# Patient Record
Sex: FEMALE | Race: OTHER | Employment: OTHER | URBAN - METROPOLITAN AREA
[De-identification: names, ages, dates, MRNs, and addresses within clinical notes are randomized per-mention and may not be internally consistent; named-entity substitution may affect disease eponyms.]

---

## 2019-03-20 NOTE — PATIENT DISCUSSION
The patient was informed that with [facility] for distance, they will need glasses for all near and intermediate activities after surgery. The patient understands there is a possibility they may need an enhancement after surgery. The patient elects [employer] IOL OD, goal of emmetropia.

## 2019-04-08 NOTE — PATIENT DISCUSSION
The patient was informed that with [address] [facility] for distance, they will need glasses for all near and intermediate activities after surgery. The patient understands there is a possibility they may need an enhancement after surgery. The patient elects [employer] IOL OD, goal of emmetropia.

## 2019-04-08 NOTE — PATIENT DISCUSSION
The patient was informed that with [address] [facility] for distance, they will need glasses for all near and intermediate activities after surgery. The patient understands there is a possibility they may need an enhancement after surgery. The patient elects [employer] IOL OD, goal of emmetropia.

## 2019-04-09 NOTE — PATIENT DISCUSSION
Cataract surgery has been performed in the first eye and activities of daily living are still impaired. The patient would like to proceed with cataract surgery in the second eye as scheduled. The patient elects CV IOL OS, goal of emmetropia.

## 2019-05-10 NOTE — PATIENT DISCUSSION
This visual field clearly demonstrated a minimum of 52% loss of upper field of vision OU, with upper lid skin in repose and elevated by taping of the lid to demonstrate potential correction. This field shows that taping the lids significantly improved this patient's superior field of vision by approximately 50%, OU.

## 2019-05-10 NOTE — PATIENT DISCUSSION
Assessment/Plan:         Diagnoses and all orders for this visit:    Immunization due  -     PNEUMOCOCCAL CONJUGATE VACCINE 13-VALENT GREATER THAN 6 MONTHS    Type 2 diabetes mellitus without complication, without long-term current use of insulin (HCC)  -     Microalbumin / creatinine urine ratio    Colon cancer screening  -     Occult Blood, Fecal Immunochemical; Future    Encounter for screening mammogram for malignant neoplasm of breast  -     Mammo screening bilateral w 3d & cad; Future    Need for influenza vaccination  -     influenza vaccine, [id number], quadrivalent, recombinant, PF, 0 5 mL, for patients 18 yr+ (FLUBLOK)  Flu shot     Benign essential hypertension  Exercise, continue present rx No added salt    Bipolar disorder, in full remission, most recent episode depressed ([facility] [address] )  Seeing Dr Tae Weinberg every three months and has been stable other than sleep issues which she will discuss with them    Other orders  -     glimepiride (AMARYL) 1 mg tablet; Discussed shingrix       Patient ID: Sahara Woodard is a 60 y o  female  HPI   Pt doing well overall  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  Her sugar trend has increased slightly  No sob or chest pain  Seeing Dr Tae Weinberg practice every three months and overall stable  Restarted exercise program  Asking for injectable for Blood suagrs that she can get thru the company [employer]          Review of Systems   Constitutional: Negative  HENT: Negative  Eyes: Negative  Respiratory: Negative  Cardiovascular: Negative  Gastrointestinal: Negative  Endocrine: Negative  Genitourinary: Negative  Musculoskeletal: Positive for back pain  Skin: Negative  Allergic/Immunologic: Negative  Neurological: Negative  Hematological: Negative  Psychiatric/Behavioral: Negative        Past Medical History:   Diagnosis Date    Alcohol abuse, uncomplicated     Anemia     last assessed 4/25/16    [facility]     Diabetes Refer for Blepharoplasty Evaluation. mellitus ([address] )     GERD (gastroesophageal reflux disease)     Hypertension     Hypothyroidism     last assessed 7/16/15    Opioid abuse [facility])      Past Surgical History:   Procedure Laterality Date    ANKLE SURGERY      last assessed 11/16/16    CERVICAL FUSION  03/2007    GASTRIC BYPASS  2000     Social History     Social History    Marital status: /Civil Union     Spouse name: N/A    Number of children: N/A    Years of education: N/A     Occupational History    Not on file  Social History Main Topics    Smoking status: Never Smoker    Smokeless tobacco: Never Used      Comment: former per [employer]    Alcohol use No      Comment: Stopped drinking alcohol    Drug use: No    Sexual activity: Not on file     Other Topics Concern    Not on file     Social History Narrative    Caffeine use    Regular dental care    Feels safe at home    Sunscreen use     Uses seatbelts     No Known Allergies            /70   Pulse 78   Temp (!) 97 3 °F (36 3 °C) (Tympanic)   SpO2 99%          Physical Exam   Constitutional: She is oriented to person, place, and time  She appears well-developed and well-nourished  No distress  HENT:   Head: Normocephalic and atraumatic  Right Ear: External ear normal    Left Ear: External ear normal    Nose: Nose normal    Mouth/Throat: Oropharynx is clear and moist  No oropharyngeal exudate  Eyes: Pupils are equal, round, and reactive to light  Conjunctivae and EOM are normal  No scleral icterus  Neck: Normal range of motion  Neck supple  No JVD present  Cardiovascular: Normal rate and regular rhythm  Pulmonary/Chest: Effort normal and breath sounds normal    Abdominal: Soft  Bowel sounds are normal    Musculoskeletal: Normal range of motion  Lymphadenopathy:     She has no cervical adenopathy  Neurological: She is alert and oriented to person, place, and time  She has normal reflexes  No cranial nerve deficit  Skin: Skin is warm and dry   She is not diaphoretic  Psychiatric: She has a normal mood and affect  Her behavior is normal  Judgment and thought content normal    Nursing note and vitals reviewed

## 2019-06-09 NOTE — PATIENT DISCUSSION
1 day PO: Patient is doing well post-operatively. The importance of post-op drop compliance was emphasized. Drop schedule reviewed with patient. Patient to call if any visual changes or concerns. 91

## 2019-06-25 NOTE — PROCEDURE NOTE: SURGICAL
<p><strong>MR #:</strong>&nbsp; [id number]<MC /><br /><strong>PREOPERATIVE DIAGNOSIS: &nbsp; &nbsp; </strong>1. Dermatochalasis upper lids; 2. Lower lid fat herniation and excess skin 3. Both lower lids Seborrheic Keratosis. <br /><br /><strong>POSTOPERATIVE DIAGNOSIS: &nbsp; </strong>Same <br /><br /><strong>PROCEDURE: &nbsp; </strong>1. Upper lid blepharoplasty both eyes; 2. C02 Laser transconjunctival blepharoplasty lower lids; 3. Skin resurfacing of lower lids 4. Cauterized both lower lids Seborrheic Keratosis. <br /><br /><strong>ANESTHESIA: &nbsp; &nbsp; </strong>Local MAC<br /><br /><strong>ESTIMATED BLOOD LOSS: &nbsp; </strong>&nbsp;Minimal, &lt;5 cc <br /><br /><strong>COMPLICATIONS: &nbsp; </strong>&nbsp;None<br /><br /><strong>INDICATION: &nbsp;</strong>This patient had complaints of heavy skin and muscle of the upper eyelids that comes down over the eyelids and affects vision. &nbsp; Examination complete with a photograph of the patient confirmed extra upper lid skin that hangs over the eye and blocks vision. Superior visual field defects were also documented on Poe visual field with marked improvement in the superior scotomas after taping the lids up. We have discussed that a standard blepharoplasty operation would remove this extra tissue from the upper lids and allow an improvement in their abnormal visual field. Patient understands the risks and benefits, including the risk of ectropion and scleral show and wishes to proceed. This patient also has large herniated fat on the lower lids and redundant skin of the lower lids. We decided to perform a lower blepharoplasty operation to remove lower lid fat bags and to lightly resurface the skin to tighten them up. Patient understands the risks and benefits of the surgery and wishes to proceed. <br /><br /><strong>PROCEDURE: &nbsp;</strong>The patient was brought to the operating room and laid in the supine position. Prior to surgery, a time-out was performed in the operating room, and the nurse confirmed the patient&rsquo;s identity and name, the side and site of the surgery and the type of surgery and procedure to be performed. &nbsp; I proceeded with the marking of the patient with a marking pen, in the areas of surgery to be performed. The patient had upper lid crease markings drawn out with a marking pen. The skin of the upper lids was then tentatively bunched with two forceps to determine the amount of skin to be removed so the patient could still close their eye and not have lagophthalmos. The superior portion of the blepharoplasty incision line was then marked in an eclipse with a marking pen. The patient was then sedated and injected with Xylocaine 2% with epinephrine. &nbsp; Approximately 6cc&rsquo;s were used for both lids. &nbsp; A drop of Alcaine was placed over both eyes. The patient was then prepped and draped in typical fashion for facial plastic surgery. Laser safe protective corneal lenses were then placed over both eyes. Two forceps were then used to recheck the upper lid measurements and the patient was remarked with a blue marking pen in a spindle shape fashion on each upper lid. A 15 blade was first used to make an incision through each pre-marked area on each upper lid. &nbsp; A Bovie cautery on cutting mode with a [place] tip was then used to remove a skin muscle flap on each upper lid. &nbsp; The orbital septum was opened and the orbital fat selectively removed with the Bovie Cautery from each upper lid. Meticulous hemostasis was achieved. After cauterizing all bleeders, each wound was then closed with running 6-0 plain sutures. &nbsp; The patient was inspected for contour and shape. An excellent appearance was noted. <br /><br />Attention was turned to the lower blepharoplasty operation where a rake was then inserted into the right lower lid, retracting it downward. A transconjunctival incision was made 2mm below the tarsus with the Bovie cautery. This incision was carried across the extent of the inner lid. Dissection was carried down with the Bovie cautery while pulling upward on the conjunctiva and retractor layer with a forceps. The fat was released from the septum and allowed to herniate forward exposing the nasal, central and temporal fat pads. Care was taken to avoid the inferior oblique muscle. These pads were teased forward and isolated. The fat was removed selectively with a Bovie cautery with excellent hemostasis. &nbsp; After judging adequate fat removal, the left lower lid had similar procedure. The skin was then resurfaced using the resurfacing CO2 laser on a low setting of 16 bella scan mode for an initial pass on both lower lids. This was done to tighten up the skin to remove wrinkles. This patient did have lid laxity so the laser was not aggressively performed on this patient in order to avoid ectropion. A second pass on the malar area was performed with the C02 laser set again at 16 bella. This was done to tighten up the malar area so fluid bags would not collect on the cheeks of this patient. </p><p>At the conclusion of the case several Seborrheic Keratosis were removed with Bovie cautery as cosmetic procedure @ N/C.</p><p>At the end of this procedure the laser lens were removed. The patient was cleaned; antibiotic salve was placed on the wounds. The patient was sent to recovery room in stable condition. <br /></p>

## 2021-05-28 ENCOUNTER — NEW PATIENT COMPREHENSIVE (OUTPATIENT)
Dept: URBAN - METROPOLITAN AREA CLINIC 36 | Facility: CLINIC | Age: 66
End: 2021-05-28

## 2021-05-28 DIAGNOSIS — H52.7: ICD-10-CM

## 2021-05-28 DIAGNOSIS — H25.813: ICD-10-CM

## 2021-05-28 PROCEDURE — 92004 COMPRE OPH EXAM NEW PT 1/>: CPT

## 2021-05-28 PROCEDURE — 92015 DETERMINE REFRACTIVE STATE: CPT

## 2021-05-28 ASSESSMENT — VISUAL ACUITY
OD_CC: 20/25-1
OD_SC: 20/200
OS_BAT: 20/70
OS_CC: 20/25-2
OD_CC: J12
OS_SC: 20/80-1
OD_BAT: 20/70
OS_CC: J10

## 2021-05-28 ASSESSMENT — TONOMETRY
OS_IOP_MMHG: 19
OD_IOP_MMHG: 19

## 2021-07-09 ENCOUNTER — GLASSES RECHECK (OUTPATIENT)
Dept: URBAN - METROPOLITAN AREA CLINIC 46 | Facility: CLINIC | Age: 66
End: 2021-07-09

## 2021-07-09 DIAGNOSIS — H52.7: ICD-10-CM

## 2021-07-09 PROCEDURE — 92015GRNC REFRACTION GLASSES RECHECK - NO CHARGE

## 2021-07-09 ASSESSMENT — VISUAL ACUITY
OD_CC: J1
OD_CC: 20/25
OS_CC: J1
OS_CC: 20/25

## 2021-11-18 ENCOUNTER — APPOINTMENT (RX ONLY)
Dept: URBAN - METROPOLITAN AREA CLINIC 132 | Facility: CLINIC | Age: 66
Setting detail: DERMATOLOGY
End: 2021-11-18

## 2021-11-18 DIAGNOSIS — D18.0 HEMANGIOMA: ICD-10-CM | Status: STABLE

## 2021-11-18 DIAGNOSIS — L82.1 OTHER SEBORRHEIC KERATOSIS: ICD-10-CM | Status: STABLE

## 2021-11-18 DIAGNOSIS — D22 MELANOCYTIC NEVI: ICD-10-CM | Status: STABLE

## 2021-11-18 DIAGNOSIS — L57.8 OTHER SKIN CHANGES DUE TO CHRONIC EXPOSURE TO NONIONIZING RADIATION: ICD-10-CM

## 2021-11-18 DIAGNOSIS — L81.4 OTHER MELANIN HYPERPIGMENTATION: ICD-10-CM | Status: STABLE

## 2021-11-18 PROBLEM — D18.01 HEMANGIOMA OF SKIN AND SUBCUTANEOUS TISSUE: Status: ACTIVE | Noted: 2021-11-18

## 2021-11-18 PROBLEM — D22.9 MELANOCYTIC NEVI, UNSPECIFIED: Status: ACTIVE | Noted: 2021-11-18

## 2021-11-18 PROCEDURE — ? SUNSCREEN RECOMMENDATIONS

## 2021-11-18 PROCEDURE — ? PRESCRIPTION MEDICATION MANAGEMENT

## 2021-11-18 PROCEDURE — ? COUNSELING

## 2021-11-18 PROCEDURE — 99203 OFFICE O/P NEW LOW 30 MIN: CPT

## 2021-11-18 NOTE — PROCEDURE: PRESCRIPTION MEDICATION MANAGEMENT
Detail Level: Zone
Render In Strict Bullet Format?: No
Plan: Patient declined prescription for rebrightalyze today.

## 2024-07-18 ENCOUNTER — HOSPITAL ENCOUNTER (EMERGENCY)
Facility: HOSPITAL | Age: 69
Discharge: HOME | End: 2024-07-18
Attending: EMERGENCY MEDICINE
Payer: COMMERCIAL

## 2024-07-18 ENCOUNTER — APPOINTMENT (EMERGENCY)
Dept: CT IMAGING | Facility: HOSPITAL | Age: 69
End: 2024-07-18
Payer: COMMERCIAL

## 2024-07-18 VITALS
BODY MASS INDEX: 26.54 KG/M2 | WEIGHT: 165.12 LBS | RESPIRATION RATE: 18 BRPM | DIASTOLIC BLOOD PRESSURE: 79 MMHG | HEIGHT: 66 IN | TEMPERATURE: 97.7 F | SYSTOLIC BLOOD PRESSURE: 177 MMHG | OXYGEN SATURATION: 99 % | HEART RATE: 84 BPM

## 2024-07-18 DIAGNOSIS — R51.9 HEADACHE: Primary | ICD-10-CM

## 2024-07-18 LAB
ANION GAP SERPL CALCULATED.3IONS-SCNC: 7 MMOL/L (ref 4–13)
BASOPHILS # BLD AUTO: 0.07 THOUSANDS/ÂΜL (ref 0–0.1)
BASOPHILS NFR BLD AUTO: 1 % (ref 0–1)
BUN SERPL-MCNC: 18 MG/DL (ref 5–25)
CALCIUM SERPL-MCNC: 9.1 MG/DL (ref 8.4–10.2)
CHLORIDE SERPL-SCNC: 110 MMOL/L (ref 96–108)
CO2 SERPL-SCNC: 23 MMOL/L (ref 21–32)
CREAT SERPL-MCNC: 0.81 MG/DL (ref 0.6–1.3)
EOSINOPHIL # BLD AUTO: 0.19 THOUSAND/ÂΜL (ref 0–0.61)
EOSINOPHIL NFR BLD AUTO: 2 % (ref 0–6)
ERYTHROCYTE [DISTWIDTH] IN BLOOD BY AUTOMATED COUNT: 13.2 % (ref 11.6–15.1)
ERYTHROCYTE [SEDIMENTATION RATE] IN BLOOD: 6 MM/HOUR (ref 0–29)
GFR SERPL CREATININE-BSD FRML MDRD: 73 ML/MIN/1.73SQ M
GLUCOSE SERPL-MCNC: 106 MG/DL (ref 65–140)
HCT VFR BLD AUTO: 38.3 % (ref 34.8–46.1)
HGB BLD-MCNC: 13 G/DL (ref 11.5–15.4)
IMM GRANULOCYTES # BLD AUTO: 0.05 THOUSAND/UL (ref 0–0.2)
IMM GRANULOCYTES NFR BLD AUTO: 1 % (ref 0–2)
LYMPHOCYTES # BLD AUTO: 2.83 THOUSANDS/ÂΜL (ref 0.6–4.47)
LYMPHOCYTES NFR BLD AUTO: 32 % (ref 14–44)
MCH RBC QN AUTO: 31 PG (ref 26.8–34.3)
MCHC RBC AUTO-ENTMCNC: 33.9 G/DL (ref 31.4–37.4)
MCV RBC AUTO: 91 FL (ref 82–98)
MONOCYTES # BLD AUTO: 0.54 THOUSAND/ÂΜL (ref 0.17–1.22)
MONOCYTES NFR BLD AUTO: 6 % (ref 4–12)
NEUTROPHILS # BLD AUTO: 5.21 THOUSANDS/ÂΜL (ref 1.85–7.62)
NEUTS SEG NFR BLD AUTO: 58 % (ref 43–75)
NRBC BLD AUTO-RTO: 0 /100 WBCS
PLATELET # BLD AUTO: 249 THOUSANDS/UL (ref 149–390)
PMV BLD AUTO: 9.4 FL (ref 8.9–12.7)
POTASSIUM SERPL-SCNC: 3.9 MMOL/L (ref 3.5–5.3)
RBC # BLD AUTO: 4.19 MILLION/UL (ref 3.81–5.12)
SODIUM SERPL-SCNC: 140 MMOL/L (ref 135–147)
WBC # BLD AUTO: 8.89 THOUSAND/UL (ref 4.31–10.16)

## 2024-07-18 PROCEDURE — 99285 EMERGENCY DEPT VISIT HI MDM: CPT | Performed by: EMERGENCY MEDICINE

## 2024-07-18 PROCEDURE — 36415 COLL VENOUS BLD VENIPUNCTURE: CPT | Performed by: EMERGENCY MEDICINE

## 2024-07-18 PROCEDURE — 99283 EMERGENCY DEPT VISIT LOW MDM: CPT

## 2024-07-18 PROCEDURE — 85025 COMPLETE CBC W/AUTO DIFF WBC: CPT | Performed by: EMERGENCY MEDICINE

## 2024-07-18 PROCEDURE — 70498 CT ANGIOGRAPHY NECK: CPT

## 2024-07-18 PROCEDURE — 85652 RBC SED RATE AUTOMATED: CPT | Performed by: EMERGENCY MEDICINE

## 2024-07-18 PROCEDURE — 80048 BASIC METABOLIC PNL TOTAL CA: CPT | Performed by: EMERGENCY MEDICINE

## 2024-07-18 PROCEDURE — 70496 CT ANGIOGRAPHY HEAD: CPT

## 2024-07-18 PROCEDURE — 96360 HYDRATION IV INFUSION INIT: CPT

## 2024-07-18 RX ORDER — AMLODIPINE BESYLATE 5 MG/1
5 TABLET ORAL DAILY
COMMUNITY
Start: 2024-07-09 | End: 2025-07-09

## 2024-07-18 RX ORDER — ALENDRONATE SODIUM 70 MG/1
70 TABLET ORAL WEEKLY
COMMUNITY

## 2024-07-18 RX ORDER — PAROXETINE HYDROCHLORIDE 20 MG/1
20 TABLET, FILM COATED ORAL DAILY
COMMUNITY
Start: 2024-03-11 | End: 2025-03-11

## 2024-07-18 RX ORDER — OXYBUTYNIN CHLORIDE 5 MG/1
5 TABLET ORAL DAILY
COMMUNITY
Start: 2024-07-03

## 2024-07-18 RX ORDER — CALCIUM/MAG/D3/B12/FA/B6/BORON 500-1.1MG
TABLET ORAL DAILY
COMMUNITY

## 2024-07-18 RX ADMIN — SODIUM CHLORIDE 500 ML: 0.9 INJECTION, SOLUTION INTRAVENOUS at 15:46

## 2024-07-18 RX ADMIN — IOHEXOL 85 ML: 350 INJECTION, SOLUTION INTRAVENOUS at 15:59

## 2024-07-18 NOTE — ED PROVIDER NOTES
History  Chief Complaint   Patient presents with    Headache     Pt presented to this ED from home c/o continuous headache radiating behind left eye and to left side neck w/intermittent dizziness over past 2 wks. Pt seen pcp last week, ordered scan but was cancelled 2 days ago by facility due to availability. Pt taking tylenol w/o relief. Denies travel/sob/fevers/cough/n/v/d     69-year-old female describes subacute onset shocky left temporal pain shooting into face intermittently with associated left neck pain, pressure.  No numbness or weakness.  No vision changes.  No injury.  Not worse with movement.  States started 2 weeks ago slowly and has progressively worsened.  Notes she has seen her primary care clinician and scheduled for CT evaluation but was canceled and still waiting to reschedule.  Notes history of migraine.  No symptoms not amenable to acetaminophen.  No pain currently      History provided by:  Patient  Headache  Pain location:  L temporal  Quality:  Stabbing  Radiates to:  L neck  Onset quality:  Gradual  Duration:  2 weeks  Timing:  Constant  Progression:  Worsening  Chronicity:  New  Similar to prior headaches: no    Relieved by:  Nothing  Worsened by:  Nothing  Ineffective treatments:  None tried  Associated symptoms: facial pain and neck pain    Associated symptoms: no abdominal pain, no blurred vision, no dizziness, no eye pain, no fever, no focal weakness, no loss of balance, no nausea, no neck stiffness, no numbness, no paresthesias, no vomiting and no weakness        Prior to Admission Medications   Prescriptions Last Dose Informant Patient Reported? Taking?   Multiple Vit-Min-Calcium-FA (Folgard OS) 500-1.1 MG TABS 7/18/2024  Yes Yes   Sig: Take by mouth in the morning   PARoxetine (PAXIL) 20 mg tablet 7/18/2024  Yes Yes   Sig: Take 20 mg by mouth daily   alendronate (FOSAMAX) 70 mg tablet Past Week  Yes Yes   Sig: Take 70 mg by mouth Once a week   amLODIPine (NORVASC) 5 mg tablet  7/17/2024  Yes Yes   Sig: Take 5 mg by mouth daily   oxybutynin (DITROPAN) 5 mg tablet 7/17/2024  Yes Yes   Sig: Take 5 mg by mouth in the morning      Facility-Administered Medications: None       History reviewed. No pertinent past medical history.    History reviewed. No pertinent surgical history.    History reviewed. No pertinent family history.  I have reviewed and agree with the history as documented.    E-Cigarette/Vaping    E-Cigarette Use Never User      E-Cigarette/Vaping Substances     Social History     Tobacco Use    Smoking status: Never    Smokeless tobacco: Never   Vaping Use    Vaping status: Never Used   Substance Use Topics    Alcohol use: Yes    Drug use: Never       Review of Systems   Constitutional:  Negative for fever.   Eyes:  Negative for blurred vision and pain.   Gastrointestinal:  Negative for abdominal pain, nausea and vomiting.   Musculoskeletal:  Positive for neck pain. Negative for neck stiffness.   Neurological:  Positive for headaches. Negative for dizziness, focal weakness, weakness, numbness, paresthesias and loss of balance.   All other systems reviewed and are negative.      Physical Exam  Physical Exam  Vitals and nursing note reviewed.   Constitutional:       General: She is not in acute distress.     Appearance: She is not ill-appearing.      Comments: Pleasant, comfortable-appearing, conversational, articulate   HENT:      Head: Normocephalic and atraumatic.      Comments: No facial or temporal or temporal arterial tenderness or overlying skin changes     Right Ear: Ear canal and external ear normal.      Left Ear: Ear canal and external ear normal.      Mouth/Throat:      Mouth: Mucous membranes are moist.      Pharynx: Oropharynx is clear.   Eyes:      General:         Right eye: No discharge.         Left eye: No discharge.      Extraocular Movements: Extraocular movements intact.      Conjunctiva/sclera: Conjunctivae normal.      Pupils: Pupils are equal, round, and  reactive to light.   Cardiovascular:      Rate and Rhythm: Normal rate and regular rhythm.      Heart sounds: Normal heart sounds.      Comments: Carotid pulses palpable, nontender, no overlying skin changes  Pulmonary:      Effort: Pulmonary effort is normal.      Breath sounds: Normal breath sounds.   Abdominal:      General: Bowel sounds are normal. There is no distension.      Palpations: Abdomen is soft.      Tenderness: There is no abdominal tenderness.   Musculoskeletal:         General: No deformity.      Cervical back: Neck supple.   Skin:     General: Skin is warm and dry.      Findings: No rash.   Neurological:      General: No focal deficit present.      Mental Status: She is alert and oriented to person, place, and time.      Cranial Nerves: No cranial nerve deficit.      Coordination: Coordination normal.   Psychiatric:         Behavior: Behavior normal.         Thought Content: Thought content normal.         Judgment: Judgment normal.         Vital Signs  ED Triage Vitals [07/18/24 1525]   Temperature Pulse Respirations Blood Pressure SpO2   97.7 °F (36.5 °C) 84 18 (!) 177/79 99 %      Temp src Heart Rate Source Patient Position - Orthostatic VS BP Location FiO2 (%)   -- Monitor Lying Left arm --      Pain Score       No Pain           Vitals:    07/18/24 1525   BP: (!) 177/79   Pulse: 84   Patient Position - Orthostatic VS: Lying         Visual Acuity      ED Medications  Medications   sodium chloride 0.9 % bolus 500 mL (0 mL Intravenous Stopped 7/18/24 1646)   iohexol (OMNIPAQUE) 350 MG/ML injection (MULTI-DOSE) 85 mL (85 mL Intravenous Given 7/18/24 1559)       Diagnostic Studies  Results Reviewed       Procedure Component Value Units Date/Time    Sedimentation rate, automated [[id number]]  (Normal) Collected: 07/18/24 1546    Lab Status: Final result Specimen: Blood from Arm, Right Updated: 07/18/24 1727     Sed Rate 6 mm/hour     Basic metabolic panel [[id number]]  (Abnormal) Collected: 07/18/24  1546    Lab Status: Final result Specimen: Blood from Arm, Right Updated: 07/18/24 1617     Sodium 140 mmol/L      Potassium 3.9 mmol/L      Chloride 110 mmol/L      CO2 23 mmol/L      ANION GAP 7 mmol/L      BUN 18 mg/dL      Creatinine 0.81 mg/dL      Glucose 106 mg/dL      Calcium 9.1 mg/dL      eGFR 73 ml/min/1.73sq m     Narrative:      [employer] guidelines for Chronic Kidney Disease (CKD):     Stage 1 with normal or high GFR (GFR > 90 mL/min/1.73 square meters)    Stage 2 Mild CKD (GFR = 60-89 mL/min/1.73 square meters)    Stage 3A Moderate CKD (GFR = 45-59 mL/min/1.73 square meters)    Stage 3B Moderate CKD (GFR = 30-44 mL/min/1.73 square meters)    Stage 4 Severe CKD (GFR = 15-29 mL/min/1.73 square meters)    Stage 5 End Stage CKD (GFR <15 mL/min/1.73 square meters)  Note: GFR calculation is accurate only with a steady state creatinine    CBC and differential [[id number]] Collected: 07/18/24 1546    Lab Status: Final result Specimen: Blood from Arm, Right Updated: 07/18/24 1551     WBC 8.89 Thousand/uL      RBC 4.19 Million/uL      Hemoglobin 13.0 g/dL      Hematocrit 38.3 %      MCV 91 fL      MCH 31.0 pg      MCHC 33.9 g/dL      RDW 13.2 %      MPV 9.4 fL      Platelets 249 Thousands/uL      nRBC 0 /100 WBCs      Segmented % 58 %      Immature Grans % 1 %      Lymphocytes % 32 %      Monocytes % 6 %      Eosinophils Relative 2 %      Basophils Relative 1 %      Absolute Neutrophils 5.21 Thousands/µL      Absolute Immature Grans 0.05 Thousand/uL      Absolute Lymphocytes 2.83 Thousands/µL      Absolute Monocytes 0.54 Thousand/µL      Eosinophils Absolute 0.19 Thousand/µL      Basophils Absolute 0.07 Thousands/µL                    CTA head and neck with and without contrast   Final Result by E. Alec Schoenberger, MD (07/18 1616)      CT Brain:  No acute intracranial abnormality.      CT Angiography: No significant stenosis of the cervical carotid or vertebral arteries   No  significant intracranial stenosis, large vessel occlusion or aneurysm                  Workstation performed: [id number]                    Procedures  Procedures         ED Course  ED Course as of 07/18/24 1937   Thu Jul 18, 2024   1913 Sed Rate: 6  We discussed results, possible trigeminal neuralgia and agreeable with close outpatient follow-up with her clinician and neurology follow-up, will return if worse or additional symptoms, copy of CT provided                      Stroke Assessment       Row Name 07/18/24 [facility]             NIH Stroke Scale    Interval Baseline      Level of Consciousness (1a.) 0      LOC Questions (1b.) 0      LOC Commands (1c.) 0      Best Gaze (2.) 0      Visual (3.) 0      Facial Palsy (4.) 0      Motor Arm, Left (5a.) 0      Motor Arm, Right (5b.) 0      Motor Leg, Left (6a.) 0      Motor Leg, Right (6b.) 0      Limb Ataxia (7.) 0      Sensory (8.) 0      Best Language (9.) 0      Dysarthria (10.) 0      Extinction and Inattention (11.) (Formerly Neglect) 0      Total 0                     Stroke Assessment       Row Name 07/18/24 1658             NIH Stroke Scale    Interval Baseline      Level of Consciousness (1a.) 0      LOC Questions (1b.) 0      LOC Commands (1c.) 0      Best Gaze (2.) 0      Visual (3.) 0      Facial Palsy (4.) 0      Motor Arm, Left (5a.) 0      Motor Arm, Right (5b.) 0      Motor Leg, Left (6a.) 0      Motor Leg, Right (6b.) 0      Limb Ataxia (7.) 0      Sensory (8.) 0      Best Language (9.) 0      Dysarthria (10.) 0      Extinction and Inattention (11.) (Formerly Neglect) 0      Total 0                     Stroke Assessment       Row Name 07/18/24 1658             NIH Stroke Scale    Interval Baseline      Level of Consciousness (1a.) 0      LOC Questions (1b.) 0      LOC Commands (1c.) 0      Best Gaze (2.) 0      Visual (3.) 0      Facial Palsy (4.) 0      Motor Arm, Left (5a.) 0      Motor Arm, Right (5b.) 0      Motor Leg, Left (6a.) 0      Motor Leg,  Right (6b.) 0      Limb Ataxia (7.) 0      Sensory (8.) 0      Best Language (9.) 0      Dysarthria (10.) 0      Extinction and Inattention (11.) (Formerly Neglect) 0      Total 0                     Stroke Assessment       Row Name 07/18/24 [facility]             NIH Stroke Scale    Interval Baseline      Level of Consciousness (1a.) 0      LOC Questions (1b.) 0      LOC Commands (1c.) 0      Best Gaze (2.) 0      Visual (3.) 0      Facial Palsy (4.) 0      Motor Arm, Left (5a.) 0      Motor Arm, Right (5b.) 0      Motor Leg, Left (6a.) 0      Motor Leg, Right (6b.) 0      Limb Ataxia (7.) 0      Sensory (8.) 0      Best Language (9.) 0      Dysarthria (10.) 0      Extinction and Inattention (11.) (Formerly Neglect) 0      Total 0                     Stroke Assessment       Row Name 07/18/24 [facility]             NIH Stroke Scale    Interval Baseline      Level of Consciousness (1a.) 0      LOC Questions (1b.) 0      LOC Commands (1c.) 0      Best Gaze (2.) 0      Visual (3.) 0      Facial Palsy (4.) 0      Motor Arm, Left (5a.) 0      Motor Arm, Right (5b.) 0      Motor Leg, Left (6a.) 0      Motor Leg, Right (6b.) 0      Limb Ataxia (7.) 0      Sensory (8.) 0      Best Language (9.) 0      Dysarthria (10.) 0      Extinction and Inattention (11.) (Formerly Neglect) 0      Total 0                                              Medical Decision Making  This patient presents with headache.   Diagnostic considerations include arterial dissection, arteritis, stroke. See ED Course.       Amount and/or Complexity of Data Reviewed  Labs: ordered. Decision-making details documented in ED Course.  Radiology: ordered and independent interpretation performed. Decision-making details documented in ED Course.  ECG/medicine tests: ordered and independent interpretation performed. Decision-making details documented in ED Course.    Risk  Prescription drug management.                 Disposition  Final diagnoses:   Headache     Time reflects  when diagnosis was documented in both MDM as applicable and the Disposition within this note       Time User Action Codes Description Comment    7/18/2024  5:01 PM Indio Barrios Add [R51.9] Headache           ED Disposition       ED Disposition   Discharge    Condition   Stable    Date/Time   Thu Jul 18, 2024  5:01 PM    Comment   Odilia FAHAD Campbell discharge to home/self care.                   Follow-up Information       Follow up With Specialties Details Why Contact Info    Vijay Herzog MD Family Medicine Schedule an appointment as soon as possible for a visit in 1 week  [address] [place], [place] [id number] [phone]              Discharge Medication List as of 7/18/2024  5:02 PM        CONTINUE these medications which have NOT CHANGED    Details   alendronate (FOSAMAX) 70 mg tablet Take 70 mg by mouth Once a week, Historical Med      amLODIPine (NORVASC) 5 mg tablet Take 5 mg by mouth daily, Starting Tue 7/9/2024, Until Wed 7/9/2025, Historical Med      Multiple Vit-Min-Calcium-FA (Folgard OS) 500-1.1 MG TABS Take by mouth in the morning, Historical Med      oxybutynin (DITROPAN) 5 mg tablet Take 5 mg by mouth in the morning, Starting Wed 7/3/2024, Historical Med      PARoxetine (PAXIL) 20 mg tablet Take 20 mg by mouth daily, Starting Mon 3/11/2024, Until Tue 3/11/2025, Historical Med                 PDMP Review       None            ED Provider  Electronically Signed by             Indio Barrios DO  07/18/24 1914       Indio Barrios DO  07/18/24 1937

## 2024-08-14 NOTE — PATIENT DISCUSSION
Your Child's Health  Five to Six-Year-Old Visit      Luz Torres  August 15, 2024    Visit Vitals  BP 94/59   Pulse 76   Ht 3' 11.5\" (1.207 m)   Wt 25.7 kg (56 lb 10.5 oz)   BMI 17.66 kg/m²     Weight:       YOUR CHILD'S 5 to 6 YEAR-OLD VISIT    School  Starting school is a major milestone for children and their family members. Good language and willingness or readiness to separate from parents easily are a couple of the most important skills indicating school readiness. Once a child is enrolled in school, parents need to keep in close contact with the teachers. Learning or developmental problems which had not been previously apparent may become evident in  or first grade. If your child had previously received some educational services or therapies in a  program, they may qualify for continued services in school. School systems are obligated to evaluate children if there are significant concerns about learning or developmental problems. If you need help accessing this type of evaluation, talk with your schools, your doctor, or you can call the Department of Public Instruction at ([phone] [phone] or visit their  website at http://dpi.wi.gov.    Help ensure your child's success at school by making sure they are well rested (a regular bedtime routine is important in this regard). Also, make sure that they have eaten (or have an opportunity to eat at school) every morning. Children who are tired or hungry are not in the best state to learn well! Make sure they are not over scheduled with afterschool activities (sports, clubs, other social activities)-- children need some unstructured downtime every day. As your child learns to read, set aside time daily to read together--it helps them learn and is a great way to spend family time together.     Social Development  Five and six-year-old children will be spending more time with friends and peers and away from their families. It is important  Will give new Rx for glasses to improve vision. to know the friends and families that your child is spending time with.     Peers have a lot of influence on each other, and your child may be interacting with friends who do not have to follow the same rules that you have set for your child. Some rules may change as they get older, but being very clear and very consistent continues to be critical component of effective parenting. Children will frequently push the limits at this age to see what they can get away with--so it is important to regularly remind your child of appropriate rules and expectations that you have for them.     Help your child feel good about themselves by giving them praise for their accomplishments, doing things together, and listening to them without interrupting. Give them opportunities to gradually be more independent and responsible.    Continue to set a good example for them - be responsible in your own obligations, mean what you say, model appropriate behavior when you yourself are angry or upset, and show respect for others by being on time. When your child is angry or frustrated, talk to them about why they are feeling that way, what their options are and how to resolve conflicts appropriately. Physical aggression - hitting, biting, kicking, throwing things- should not be tolerated at this age; teach your children healthier ways to respond to upsetting situations and blow off steam.    Households with working parents and children school are busy! Try to devote mealtimes, bedtimes, and even driving time, to talk with your children-- keep phones and other electronic media turned off and focus on talking to each other.     Remind your children that they can tell you anything. It is especially important that they know to report you if they feel they are being teased or bullied. They should also be taught to report bullying that they witness to you or to a teacher. Any concerns about bullying should be addressed-talk to your teachers,  administrators or guidance counselors at the school to help with this issue. Good online resources regarding bullying are [employer]gov and the American Academy of Pediatrics \"HealthyChildren.org\" website (search for \"Bullying\").     Dental  Your child should be brushing at least twice daily for 2 minutes at a time with a pea-sized amount of regular (fluoridated) toothpaste. Children this age can also be taught to floss their teeth, but they still need a parent’s help to make sure all their back teeth are brushed well. Look for a dentist if you do not already have one. Limiting candy, other sweets, juice and sticky/chewy foods is good for their dental health.     Nutrition  Your child will be making more of their own choices about what to eat, so keep plenty of nutritious foods in your home for meal and snack times. Make sure your child eats something healthy for breakfast every morning; this is proven to positively impact school performance and learning. Your child needs ~3 servings of good sources of calcium everyday; this can include lowfat (or skim) milk, yogurt, low fat cheese or foods which have been fortified with calcium. Children this age should get 600 IU of vitamin D daily which (along with appropriate calcium intake) ensures good bone health. Most people cannot meet their vitamin D needs with their usual diet, so a multivitamin with iron supplement continues to be appropriate for this age. (A pure vitamin D supplement with 400 or 600 IU is also okay.)    Overweight and obesity are very serious problems; teaching your growing child to make healthy choices is important, as is continuing to avoid unhealthy choices like greasy fast food, bagged snacks, sodas and sweet drinks, lots of juice, candies and sweets. Make unhealthy choices an occasional treat only; don’t keep them in your home, because your child will find them!    Physical Activity and Screen Time   A child who enjoys being physically active  when they are young will be more likely to stay active as they grow older. Set a goal of 60 minutes of physical activity every day--it can be all at once or broken up into shorter segments. Try to make it a family activity as much as possible.     Time watching television, playing on the computer or using any other form of electronic media is NOT physical activity. As your child learns to read and their fine motor skills improve, they are going to become very skilled at using the computer and Internet (and they are going to like it!). Setting limits and supervising media use is very important. Set a time limit for media use each day (and enforce it). Consider setting up child-specific browsers and creating a “Favorites” toolbar so your child can only get to approved websites. For suggestions on developing healthy media habits, do an internet search for “healthychildren media use plan” for recommendations from the American Academy of Pediatrics.  Also, don't allow snacking in front of the TV set-- that is another unhealthy habit that is easier to prevent than change!    And parents need to be a role model--if you are constantly on your phone in situations where you could be talking with or interacting with your child, you are teaching them that the phone takes priority over your interaction with them.        Safety  Car:  Until a child weighs at least 40 pounds, they are safest in a rear or forward-facing car seat with a 5-point harness. If your 5-point harness seat has a higher weight limit than 40 pounds, keep your child in it-- they are safest in these seats until they outgrow the 's recommended size limits. (There is not a specific height limit for most of these seats, but children are safe as long as the top of their ears are below the top/ back edge of the seat and their shoulders are below the highest shoulder strap slots.) When they do outgrow the 5-point harness seat limits, they should ride in a  booster seat in the backseat. High-backed booster seats should be used if there are low seat backs or no head rests in your car; backless boosters can be used if your car has high seat backs and head rests.    Street Safety: Teach your child how to be safe when standing outside waiting for a bus or walking to school. Children this age should always be supervised when crossing streets.     Biking: Children (and their parents) should wear properly fitted helmets when biking. Children this age are not safe riding in the street.    Water & Sun Safety: Swimming lessons are a good idea if your child does not know how to swim yet. Even if they can swim, constant supervision by an adult who know how to swim is a must. Remember to use sunscreen with an SPF of 15 or higher when outside and reapply after time in the water.  Your child should avoid prolonged time in the sun between 11 AM and 3 PM and wear hats, sunglasses and sun protection clothing.     Personal Safety: A parent’s safety is just as important as a child’s safety. Violence is common in many people’s lives. If you do not feel safe in your home or if a partner has ever hit, kicked, shoved or physically hurt you or your child, it is important for you to get help. Talk to your doctors or a . In [place], resources include [facility] (904-583-7760) and the [facility] (24 hour hotline is 229- 160-2660); the National Domestic Violence Hotline is 0-802-719-XTIP (6673).    Review with your child that certain body parts (the parts usually covered by a bathing suit) and behaviors are private. For safety purposes, make sure your child knows that they should never keep secrets from parents, and they should always report to you if any adult shows any interest in their private parts (or if an adult discussed or shared their own private parts with a child). Tell them they should talk to you if any adult is doing or saying anything  that makes them uncomfortable, especially if an adult is asking them to keep a secret.    Fires & Burns: Children this age are fascinated by fires and they can be very compulsive--so watch them very carefully around fires,  grills, and stoves. Make sure matches and lighters are safely stored out of reach and continue to keep all hot items out of reach. Have a family fire safety plan, including regular smoke detector (and carbon monoxide detector) battery checks, working fire extinguishers, and escape routes. It is important that children this age know what door they should go out of if the smoke alarm goes off, where to meet family members outside and the importance of not going back into a burning building.     Firearms: Children this age are not capable of understanding how dangerous firearms are and evidence shows a child is safest in a home where no firearms are stored. If there any hunters or firearm owners in your home or anywhere your child is visiting, make sure all weapons are safely stored: unloaded, securely locked and ammunition stored separately.   Smoking: Continue to protect your child from cigarette smoke; secondhand smoke increases the risk of heart and lung disease in your child. Vapors from e-cigarettes may also be harmful, so don’t use those around your child either. If you smoke and are ready to consider quitting, talk to your doctor. Nicotine replacement products can be very helpful in breaking this tough addiction. 6-800-QUIT-NOW is a national help line that can help you find resources; other resources can be found at cdc.gov.       MEDICATION FOR FEVER OR PAIN:   Acetaminophen liquid (e.g., Tylenol or Tempra) may be given every four hours as needed for pain or fever.  Acetaminophen liquid is less concentrated than the infant dropper bottle type.  Be sure to check which product CONCENTRATION you are using.    OLD Concentration INFANT Tylenol/Acetaminophen  Drops (80 MG/0.8 mL)    Child’s  Weight: Dose:  36 - 47 pounds:   240 mg (3 droppers)  48 - 59 pounds:   320 mg (4 droppers)    NEW Concentration INFANT Tylenol/Acetaminophen  Drops (160 MG/5 mL)    Child’s Weight: Dose:  36 - 47 pounds:   240 mg (7.5 mL (1 1/2 Teaspoons))  48 - 59 pounds:   320 mg (10.0 mL (2 Teaspoons))    CHILDREN’S Tylenol/Acetaminophen  (160 MG/5 mL)    Child’s Weight: Dose:  36 - 47 pounds:   240 240 mg (7.5 mL (1 1/2 Teaspoons))  48 - 59 pounds:   320 mg (10.0 mL (2 Teaspoons))    CHILDREN’S Tylenol/Acetaminophen MELTAWAYS ( 80 MG tablets)    Child’s Weight:  Dose:  36 - 47 pounds:    240 mg (3 meltaway tablets)  48 - 59 pounds:    320 mg (4 meltaway tablets)    CHILDREN'S Ibuprofen liquid (e.g., Advil or Motrin) may be given every six hours as needed for pain or fever.    Child’s Weight:  Dose:  36 - 47 pounds:    150 MG (1 1/2 Teaspoons)  48 - 59 pounds  200 mg (2 Teaspoons)     Most Recent Immunizations   Administered Date(s) Administered   • DTaP 02/28/2020   • DTaP/HIB/IPV 01/23/2019   • DTaP/IPV 09/30/2022   • Hep A, ped/adol, 2 dose 02/28/2020   • Hep B, adolescent or pediatric 04/26/2019   • Hib (PRP-T) 08/01/2019   • Influenza, split virus, quadrivalent, PF 11/13/2023   • MMR 08/01/2019   • Measles Mumps Rubella Varicella 09/30/2022   • Pneumococcal conjugate PCV 13 08/01/2019   • Rotavirus - pentavalent 01/23/2019   • Varicella 08/01/2019       If [place] develops any of the following reactions within 72 hours after an immunization, notify your pediatrician by calling the pediatric phone nurse:  1.  A temperature of 105 degrees or above.  2.  More than 3 hours of continuous crying.  3.  A shrill, high-pitched cry.  4.  A pale, limp spell.  5.  A seizure or fainting spell.  In this case, you should call 911 or go immediately to the emergency room.      NEXT VISIT:  5 YEARS OF AGE    Thank you for entrusting your care to [facility].    Also, check out “Children’s Health” on the [facility] Blog for  updates on timely topics regarding children’s health!          Your Child's Health  Five to Six-Year-Old Visit      Luz Torres  August 15, 2024    Visit Vitals  BP 94/59   Pulse 76   Ht 3' 11.5\" (1.207 m)   Wt 25.7 kg (56 lb 10.5 oz)   BMI 17.66 kg/m²     Weight: 56.66 lbs      YOUR CHILD'S 5 to 6 YEAR-OLD VISIT    School  Starting school is a major milestone for children and their family members. Good language and willingness or readiness to separate from parents easily are a couple of the most important skills indicating school readiness. Once a child is enrolled in school, parents need to keep in close contact with the teachers. Learning or developmental problems which had not been previously apparent may become evident in  or first grade. If your child had previously received some educational services or therapies in a  program, they may qualify for continued services in school. School systems are obligated to evaluate children if there are significant concerns about learning or developmental problems. If you need help accessing this type of evaluation, talk with your schools, your doctor, or you can call the Department of Public Instruction at (226) 555-3575 or visit their  website at http://dpi.wi.gov.    Help ensure your child's success at school by making sure they are well rested (a regular bedtime routine is important in this regard). Also, make sure that they have eaten (or have an opportunity to eat at school) every morning. Children who are tired or hungry are not in the best state to learn well! Make sure they are not over scheduled with afterschool activities (sports, clubs, other social activities)-- children need some unstructured downtime every day. As your child learns to read, set aside time daily to read together--it helps them learn and is a great way to spend family time together.     Social Development  Five and six-year-old children will be spending more time  with friends and peers and away from their families. It is important to know the friends and families that your child is spending time with.     Peers have a lot of influence on each other, and your child may be interacting with friends who do not have to follow the same rules that you have set for your child. Some rules may change as they get older, but being very clear and very consistent continues to be critical component of effective parenting. Children will frequently push the limits at this age to see what they can get away with--so it is important to regularly remind your child of appropriate rules and expectations that you have for them.     Help your child feel good about themselves by giving them praise for their accomplishments, doing things together, and listening to them without interrupting. Give them opportunities to gradually be more independent and responsible.    Continue to set a good example for them - be responsible in your own obligations, mean what you say, model appropriate behavior when you yourself are angry or upset, and show respect for others by being on time. When your child is angry or frustrated, talk to them about why they are feeling that way, what their options are and how to resolve conflicts appropriately. Physical aggression - hitting, biting, kicking, throwing things- should not be tolerated at this age; teach your children healthier ways to respond to upsetting situations and blow off steam.    Households with working parents and children school are busy! Try to devote mealtimes, bedtimes, and even driving time, to talk with your children-- keep phones and other electronic media turned off and focus on talking to each other.     Remind your children that they can tell you anything. It is especially important that they know to report you if they feel they are being teased or bullied. They should also be taught to report bullying that they witness to you or to a teacher. Any  concerns about bullying should be addressed-talk to your teachers, administrators or guidance counselors at the school to help with this issue. Good online resources regarding bullying are [employer].gov and the American Academy of Pediatrics \"HealthyChildren.org\" website (search for \"Bullying\").     Dental  Your child should be brushing at least twice daily for 2 minutes at a time with a pea-sized amount of regular (fluoridated) toothpaste. Children this age can also be taught to floss their teeth, but they still need a parent’s help to make sure all their back teeth are brushed well. Look for a dentist if you do not already have one. Limiting candy, other sweets, juice and sticky/chewy foods is good for their dental health.     Nutrition  Your child will be making more of their own choices about what to eat, so keep plenty of nutritious foods in your home for meal and snack times. Make sure your child eats something healthy for breakfast every morning; this is proven to positively impact school performance and learning. Your child needs ~3 servings of good sources of calcium everyday; this can include lowfat (or skim) milk, yogurt, low fat cheese or foods which have been fortified with calcium. Children this age should get 600 IU of vitamin D daily which (along with appropriate calcium intake) ensures good bone health. Most people cannot meet their vitamin D needs with their usual diet, so a multivitamin with iron supplement continues to be appropriate for this age. (A pure vitamin D supplement with 400 or 600 IU is also okay.)    Overweight and obesity are very serious problems; teaching your growing child to make healthy choices is important, as is continuing to avoid unhealthy choices like greasy fast food, bagged snacks, sodas and sweet drinks, lots of juice, candies and sweets. Make unhealthy choices an occasional treat only; don’t keep them in your home, because your child will find them!    Physical  Activity and Screen Time   A child who enjoys being physically active when they are young will be more likely to stay active as they grow older. Set a goal of 60 minutes of physical activity every day--it can be all at once or broken up into shorter segments. Try to make it a family activity as much as possible.     Time watching television, playing on the computer or using any other form of electronic media is NOT physical activity. As your child learns to read and their fine motor skills improve, they are going to become very skilled at using the computer and Internet (and they are going to like it!). Setting limits and supervising media use is very important. Set a time limit for media use each day (and enforce it). Consider setting up child-specific browsers and creating a “Favorites” toolbar so your child can only get to approved websites. For suggestions on developing healthy media habits, do an internet search for “healthychildren media use plan” for recommendations from the American Academy of Pediatrics.  Also, don't allow snacking in front of the TV set-- that is another unhealthy habit that is easier to prevent than change!    And parents need to be a role model--if you are constantly on your phone in situations where you could be talking with or interacting with your child, you are teaching them that the phone takes priority over your interaction with them.        Safety  Car:  Until a child weighs at least 40 pounds, they are safest in a rear or forward-facing car seat with a 5-point harness. If your 5-point harness seat has a higher weight limit than 40 pounds, keep your child in it-- they are safest in these seats until they outgrow the 's recommended size limits. (There is not a specific height limit for most of these seats, but children are safe as long as the top of their ears are below the top/ back edge of the seat and their shoulders are below the highest shoulder strap slots.) When  they do outgrow the 5-point harness seat limits, they should ride in a booster seat in the backseat. High-backed booster seats should be used if there are low seat backs or no head rests in your car; backless boosters can be used if your car has high seat backs and head rests.    Street Safety: Teach your child how to be safe when standing outside waiting for a bus or walking to school. Children this age should always be supervised when crossing streets.     Biking: Children (and their parents) should wear properly fitted helmets when biking. Children this age are not safe riding in the street.    Water & Sun Safety: Swimming lessons are a good idea if your child does not know how to swim yet. Even if they can swim, constant supervision by an adult who know how to swim is a must. Remember to use sunscreen with an SPF of 15 or higher when outside and reapply after time in the water.  Your child should avoid prolonged time in the sun between 11 AM and 3 PM and wear hats, sunglasses and sun protection clothing.     Personal Safety: A parent’s safety is just as important as a child’s safety. Violence is common in many people’s lives. If you do not feel safe in your home or if a partner has ever hit, kicked, shoved or physically hurt you or your child, it is important for you to get help. Talk to your doctors or a . In [place], resources include [facility] (162-459-2996) and the [facility] (24 hour hotline is 240- 377-5112); the National Domestic Violence Hotline is 5-977-096-TLRK (5395).    Review with your child that certain body parts (the parts usually covered by a bathing suit) and behaviors are private. For safety purposes, make sure your child knows that they should never keep secrets from parents, and they should always report to you if any adult shows any interest in their private parts (or if an adult discussed or shared their own private parts with a child). Tell  them they should talk to you if any adult is doing or saying anything that makes them uncomfortable, especially if an adult is asking them to keep a secret.    Fires & Burns: Children this age are fascinated by fires and they can be very compulsive--so watch them very carefully around fires,  grills, and stoves. Make sure matches and lighters are safely stored out of reach and continue to keep all hot items out of reach. Have a family fire safety plan, including regular smoke detector (and carbon monoxide detector) battery checks, working fire extinguishers, and escape routes. It is important that children this age know what door they should go out of if the smoke alarm goes off, where to meet family members outside and the importance of not going back into a burning building.     Firearms: Children this age are not capable of understanding how dangerous firearms are and evidence shows a child is safest in a home where no firearms are stored. If there any hunters or firearm owners in your home or anywhere your child is visiting, make sure all weapons are safely stored: unloaded, securely locked and ammunition stored separately.   Smoking: Continue to protect your child from cigarette smoke; secondhand smoke increases the risk of heart and lung disease in your child. Vapors from e-cigarettes may also be harmful, so don’t use those around your child either. If you smoke and are ready to consider quitting, talk to your doctor. Nicotine replacement products can be very helpful in breaking this tough addiction. 7-800-QUIT-NOW is a national help line that can help you find resources; other resources can be found at cdc.gov.       MEDICATION FOR FEVER OR PAIN:   Acetaminophen liquid (e.g., Tylenol or Tempra) may be given every four hours as needed for pain or fever.  Acetaminophen liquid is less concentrated than the infant dropper bottle type.  Be sure to check which product CONCENTRATION you are using.    OLD  Concentration INFANT Tylenol/Acetaminophen  Drops (80 MG/0.8 mL)    Child’s Weight: Dose:  36 - 47 pounds:   240 mg (3 droppers)  48 - 59 pounds:   320 mg (4 droppers)    NEW Concentration INFANT Tylenol/Acetaminophen  Drops (160 MG/5 mL)    Child’s Weight: Dose:  36 - 47 pounds:   240 mg (7.5 mL (1 1/2 Teaspoons))  48 - 59 pounds:   320 mg (10.0 mL (2 Teaspoons))    CHILDREN’S Tylenol/Acetaminophen  (160 MG/5 mL)    Child’s Weight: Dose:  36 - 47 pounds:   240 240 mg (7.5 mL (1 1/2 Teaspoons))  48 - 59 pounds:   320 mg (10.0 mL (2 Teaspoons))    CHILDREN’S Tylenol/Acetaminophen MELTAWAYS ( 80 MG tablets)    Child’s Weight:  Dose:  36 - 47 pounds:    240 mg (3 meltaway tablets)  48 - 59 pounds:    320 mg (4 meltaway tablets)    CHILDREN'S Ibuprofen liquid (e.g., Advil or Motrin) may be given every six hours as needed for pain or fever.    Child’s Weight:  Dose:  36 - 47 pounds:    150 MG (1 1/2 Teaspoons)  48 - 59 pounds  200 mg (2 Teaspoons)     Most Recent Immunizations   Administered Date(s) Administered   • DTaP 02/28/2020   • DTaP/HIB/IPV 01/23/2019   • DTaP/IPV 09/30/2022   • Hep A, ped/adol, 2 dose 02/28/2020   • Hep B, adolescent or pediatric 04/26/2019   • Hib (PRP-T) 08/01/2019   • Influenza, split virus, quadrivalent, PF 11/13/2023   • MMR 08/01/2019   • Measles Mumps Rubella Varicella 09/30/2022   • Pneumococcal conjugate PCV 13 08/01/2019   • Rotavirus - pentavalent 01/23/2019   • Varicella 08/01/2019       If [place] develops any of the following reactions within 72 hours after an immunization, notify your pediatrician by calling the pediatric phone nurse:  1.  A temperature of 105 degrees or above.  2.  More than 3 hours of continuous crying.  3.  A shrill, high-pitched cry.  4.  A pale, limp spell.  5.  A seizure or fainting spell.  In this case, you should call 911 or go immediately to the emergency room.      NEXT VISIT:  6 YEARS OF AGE    Thank you for entrusting your care to [facility].    Also, check out “Children’s Health” on the [facility][note 3] Blog for updates on timely topics regarding children’s health!

## 2024-09-25 ENCOUNTER — TELEPHONE (OUTPATIENT)
Age: 69
End: 2024-09-25

## 2024-09-25 ENCOUNTER — CONSULT (OUTPATIENT)
Dept: NEUROLOGY | Facility: CLINIC | Age: 69
End: 2024-09-25

## 2024-09-25 VITALS
OXYGEN SATURATION: 99 % | BODY MASS INDEX: 27.1 KG/M2 | TEMPERATURE: 97.8 F | HEIGHT: 66 IN | WEIGHT: 168.6 LBS | SYSTOLIC BLOOD PRESSURE: 124 MMHG | HEART RATE: 70 BPM | DIASTOLIC BLOOD PRESSURE: 66 MMHG

## 2024-09-25 DIAGNOSIS — R51.9 CEPHALALGIA: ICD-10-CM

## 2024-09-25 DIAGNOSIS — R51.9 HEADACHE: ICD-10-CM

## 2024-09-25 DIAGNOSIS — G50.0 TRIGEMINAL NEURALGIA: Primary | ICD-10-CM

## 2024-09-25 PROCEDURE — 99204 OFFICE O/P NEW MOD 45 MIN: CPT | Performed by: PSYCHIATRY & NEUROLOGY

## 2024-09-25 RX ORDER — AMITRIPTYLINE HYDROCHLORIDE 10 MG/1
10 TABLET ORAL
Qty: 30 TABLET | Refills: 2 | Status: SHIPPED | OUTPATIENT
Start: 2024-09-25

## 2024-09-25 NOTE — ASSESSMENT & PLAN NOTE
Placed her on amitriptyline 10mg at bedtime  Will obtain Mri brain with and without contrast  -will also get Mri C-spine to take a look as well  She does not want to do PT at this time   Orders:    Ambulatory Referral to Neurology

## 2024-09-25 NOTE — TELEPHONE ENCOUNTER
Pharmacy called in about patients medications     He would like to know if the amitriptyline (ELAVIL) 10 mg  that was prescribed by Dr Rodriguez was to be taken with the medication PARoxetine (PAXIL) 20 mg tablet or to replace it     Pharmacy call back- [phone]    Please advise   Thank you

## 2024-09-25 NOTE — PROGRESS NOTES
Ambulatory Visit  Name: Odilia Campbell      : 1955      MRN: [id number]  Encounter Provider: Brianna Rodriguez MD  Encounter Date: 2024   Encounter department: [facility] [place]    This is a 70 y/o  Female who is here as a new patient to establish care with us. Patient is here for headache, and concerning for either trigeminal neuralgia, it does not fit the distribution, vs cervicogenic headache.       Assessment & Plan  Headache  Placed her on amitriptyline 10mg at bedtime  Will obtain Mri brain with and without contrast  -will also get Mri C-spine to take a look as well  She does not want to do PT at this time   Orders:    Ambulatory Referral to Neurology    Trigeminal neuralgia    Orders:    MRI brain with and without contrast; Future    amitriptyline (ELAVIL) 10 mg tablet; Take 1 tablet (10 mg total) by mouth daily at bedtime      4 to 5 months.  Cephalalgia    Orders:    amitriptyline (ELAVIL) 10 mg tablet; Take 1 tablet (10 mg total) by mouth daily at bedtime    MRI cervical spine with and without contrast; Future    Follow up in 4 to 5 months.    I would be happy to see the patient sooner if any new questions/concerns arise.  Patient/Guardian was advised to the call the office if they have any questions and concerns in the meantime.     Patient/Guardian does understand that if they have any new stroke like symptoms such as facial droop on one side, weakness/paralysis on either side, speech trouble, numbness on one side, balance issues, any vision changes, extreme dizziness or any new headache, to call  immediately or to proceed to the nearest ER immediately.        History of Present Illness   HPI    This is a 70 y/o Female who is here as a new patient to establish care with us.    69-year-old patient here with headaches that started recently.  She is having about 5/week they can last for seconds sometimes minutes.  On a scale of 1-10 headaches are 8-9 out of  10.  It is burning located on the left side of the neck and goes into her left temple.  Feels an electrical shooting pain.  She is retired as needed workdays.  She is having a hard time concentrating and is sensitive to light and sound at times but does not have to be with a headache.    Review of Systems   Constitutional: Negative.    HENT: Negative.     Eyes: Negative.    Respiratory: Negative.     Cardiovascular: Negative.    Gastrointestinal: Negative.    Endocrine: Negative.    Genitourinary: Negative.    Musculoskeletal:  Positive for neck pain.   Skin: Negative.    Allergic/Immunologic: Negative.    Neurological:  Positive for headaches (back of the head to L eye or R eye).   Hematological: Negative.    Psychiatric/Behavioral: Negative.     All other systems reviewed and are negative.  phonophobia   have personally reviewed the MA's review of systems and made changes as necessary.    Pertinent Medical History       Medical History Reviewed by provider this encounter:       Past Medical History   History reviewed. No pertinent past medical history.  History reviewed. No pertinent surgical history.  History reviewed. No pertinent family history.  Current Outpatient Medications on File Prior to Visit   Medication Sig Dispense Refill    alendronate (FOSAMAX) 70 mg tablet Take 70 mg by mouth Once a week      amLODIPine (NORVASC) 5 mg tablet Take 5 mg by mouth daily      Multiple Vit-Min-Calcium-FA (Folgard OS) 500-1.1 MG TABS Take by mouth in the morning      oxybutynin (DITROPAN) 5 mg tablet Take 5 mg by mouth in the morning      PARoxetine (PAXIL) 20 mg tablet Take 20 mg by mouth daily       No current facility-administered medications on file prior to visit.     Allergies   Allergen Reactions    Lisinopril Tinnitus    Statins Myalgia      Current Outpatient Medications on File Prior to Visit   Medication Sig Dispense Refill    alendronate (FOSAMAX) 70 mg tablet Take 70 mg by mouth Once a week      amLODIPine  "(NORVASC) 5 mg tablet Take 5 mg by mouth daily      Multiple Vit-Min-Calcium-FA (Folgard OS) 500-1.1 MG TABS Take by mouth in the morning      oxybutynin (DITROPAN) 5 mg tablet Take 5 mg by mouth in the morning      PARoxetine (PAXIL) 20 mg tablet Take 20 mg by mouth daily       No current facility-administered medications on file prior to visit.      Social History     Tobacco Use    Smoking status: Never    Smokeless tobacco: Never   Vaping Use    Vaping status: Never Used   Substance and Sexual Activity    Alcohol use: Yes    Drug use: Never    Sexual activity: Not on file     Objective     /66   Pulse 70   Temp 97.8 °F (36.6 °C) (Temporal)   Ht 5' 5.5\" (1.664 m)   Wt 76.5 kg (168 lb 9.6 oz)   SpO2 99%   BMI 27.63 kg/m²     Physical Exam    General - patient is alert   Speech - no dysarthria noted, no aphasia noted.     Neuro:   Cranial nerves: PERRL, EOMI, facial sensation intact to soft touch in V1, V2 and V3, no facial asymmetry noted, uvula/palate midline, tongue midline.   Motor: 5/5 throughout, normal tone, no pronator drift noted.   Sensory - intact to soft touch throughout  Reflexes - 2+ throughout  Coordination - no ataxia/dysmetria noted  Gait - normal    Neurologic Exam    Results/Data:  I have reviewed the results and images from the in detail with the patient.          "

## 2024-09-25 NOTE — ASSESSMENT & PLAN NOTE
Orders:    MRI brain with and without contrast; Future    amitriptyline (ELAVIL) 10 mg tablet; Take 1 tablet (10 mg total) by mouth daily at bedtime      4 to 5 months.

## 2024-09-27 NOTE — TELEPHONE ENCOUNTER
Called [facility] pharmacy and left a detailed message for the pharmacist making him aware. Advised that he call the office back with any questions.

## 2024-09-27 NOTE — ASSESSMENT & PLAN NOTE
Orders:    amitriptyline (ELAVIL) 10 mg tablet; Take 1 tablet (10 mg total) by mouth daily at bedtime    MRI cervical spine with and without contrast; Future    Follow up in 4 to 5 months.    I would be happy to see the patient sooner if any new questions/concerns arise.  Patient/Guardian was advised to the call the office if they have any questions and concerns in the meantime.     Patient/Guardian does understand that if they have any new stroke like symptoms such as facial droop on one side, weakness/paralysis on either side, speech trouble, numbness on one side, balance issues, any vision changes, extreme dizziness or any new headache, to call 9-1-1 immediately or to proceed to the nearest ER immediately.

## 2024-10-02 ENCOUNTER — TELEPHONE (OUTPATIENT)
Age: 69
End: 2024-10-02

## 2024-10-02 NOTE — TELEPHONE ENCOUNTER
Talha from [facility] called stating they started a PA for  amitriptyline  and will be faxing paperwork to the office.

## 2024-10-04 NOTE — TELEPHONE ENCOUNTER
Fax not received. Called [facility]. The med does require a prior auth, but pt already picked the medication up by using a GoodRx coupon, and the cost was under $5.00. Did submit a PA through [facility], Key # [id number]. Awaiting determination.

## 2024-10-07 NOTE — TELEPHONE ENCOUNTER
Approval letter for amitriptyline is scanned into pt's chart. Med is approved until 10/3/25. Called [facility] and left a message for the pharmacy making them aware of the approval.

## 2024-10-24 ENCOUNTER — HOSPITAL ENCOUNTER (OUTPATIENT)
Dept: MRI IMAGING | Facility: HOSPITAL | Age: 69
End: 2024-10-24
Attending: PSYCHIATRY & NEUROLOGY
Payer: COMMERCIAL

## 2024-10-24 DIAGNOSIS — G50.0 TRIGEMINAL NEURALGIA: ICD-10-CM

## 2024-10-24 DIAGNOSIS — R51.9 CEPHALALGIA: ICD-10-CM

## 2024-10-24 PROCEDURE — 72156 MRI NECK SPINE W/O & W/DYE: CPT

## 2024-10-24 PROCEDURE — A9585 GADOBUTROL INJECTION: HCPCS | Performed by: PSYCHIATRY & NEUROLOGY

## 2024-10-24 PROCEDURE — 70553 MRI BRAIN STEM W/O & W/DYE: CPT

## 2024-10-24 RX ORDER — GADOBUTROL 604.72 MG/ML
7.5 INJECTION INTRAVENOUS
Status: COMPLETED | OUTPATIENT
Start: 2024-10-24 | End: 2024-10-24

## 2024-10-24 RX ADMIN — GADOBUTROL 7.5 ML: 604.72 INJECTION INTRAVENOUS at 11:29

## 2025-01-27 ENCOUNTER — TELEPHONE (OUTPATIENT)
Dept: NEUROLOGY | Facility: CLINIC | Age: 70
End: 2025-01-27

## 2025-05-30 NOTE — PATIENT DISCUSSION
Refer for Blepharoplasty Evaluation. Airway patent, TM normal bilaterally, normal appearing mouth, nose, throat, neck supple with full range of motion, no cervical adenopathy.